# Patient Record
Sex: MALE | Race: WHITE | Employment: FULL TIME | URBAN - METROPOLITAN AREA
[De-identification: names, ages, dates, MRNs, and addresses within clinical notes are randomized per-mention and may not be internally consistent; named-entity substitution may affect disease eponyms.]

---
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
## 2024-01-27 ENCOUNTER — APPOINTMENT (OUTPATIENT)
Dept: GENERAL RADIOLOGY | Facility: HOSPITAL | Age: 24
End: 2024-01-27
Attending: STUDENT IN AN ORGANIZED HEALTH CARE EDUCATION/TRAINING PROGRAM
Payer: COMMERCIAL

## 2024-01-27 ENCOUNTER — HOSPITAL ENCOUNTER (EMERGENCY)
Facility: HOSPITAL | Age: 24
Discharge: HOME | End: 2024-01-27
Attending: STUDENT IN AN ORGANIZED HEALTH CARE EDUCATION/TRAINING PROGRAM
Payer: COMMERCIAL

## 2024-01-27 VITALS
HEIGHT: 72 IN | TEMPERATURE: 98 F | SYSTOLIC BLOOD PRESSURE: 137 MMHG | HEART RATE: 78 BPM | OXYGEN SATURATION: 99 % | DIASTOLIC BLOOD PRESSURE: 67 MMHG | WEIGHT: 220 LBS | BODY MASS INDEX: 29.8 KG/M2 | RESPIRATION RATE: 16 BRPM

## 2024-01-27 DIAGNOSIS — L97.419 HEEL ULCERATION, RIGHT, WITH UNSPECIFIED SEVERITY (HCC): Primary | ICD-10-CM

## 2024-01-27 PROCEDURE — 99284 EMERGENCY DEPT VISIT MOD MDM: CPT

## 2024-01-27 PROCEDURE — 87147 CULTURE TYPE IMMUNOLOGIC: CPT | Performed by: STUDENT IN AN ORGANIZED HEALTH CARE EDUCATION/TRAINING PROGRAM

## 2024-01-27 PROCEDURE — 73650 X-RAY EXAM OF HEEL: CPT | Performed by: STUDENT IN AN ORGANIZED HEALTH CARE EDUCATION/TRAINING PROGRAM

## 2024-01-27 PROCEDURE — 87070 CULTURE OTHR SPECIMN AEROBIC: CPT | Performed by: STUDENT IN AN ORGANIZED HEALTH CARE EDUCATION/TRAINING PROGRAM

## 2024-01-27 PROCEDURE — 87186 SC STD MICRODIL/AGAR DIL: CPT | Performed by: STUDENT IN AN ORGANIZED HEALTH CARE EDUCATION/TRAINING PROGRAM

## 2024-01-27 PROCEDURE — 87205 SMEAR GRAM STAIN: CPT | Performed by: STUDENT IN AN ORGANIZED HEALTH CARE EDUCATION/TRAINING PROGRAM

## 2024-01-27 PROCEDURE — 87077 CULTURE AEROBIC IDENTIFY: CPT | Performed by: STUDENT IN AN ORGANIZED HEALTH CARE EDUCATION/TRAINING PROGRAM

## 2024-01-27 RX ORDER — CEPHALEXIN 500 MG/1
500 CAPSULE ORAL 4 TIMES DAILY
Qty: 28 CAPSULE | Refills: 0 | Status: SHIPPED | OUTPATIENT
Start: 2024-01-27 | End: 2024-02-03

## 2024-01-27 RX ORDER — DOXYCYCLINE HYCLATE 100 MG/1
100 CAPSULE ORAL 2 TIMES DAILY
Qty: 14 CAPSULE | Refills: 0 | Status: SHIPPED | OUTPATIENT
Start: 2024-01-27 | End: 2024-02-03

## 2024-01-27 NOTE — ED PROVIDER NOTES
Patient Seen in: [facility] Emergency Department      History     Chief Complaint   Patient presents with    Cellulitis     Stated Complaint: R foot pain    Subjective:   HPI    24-year-old male otherwise healthy presenting for evaluation of pain.  Patient was in [facility] a little over 2 weeks ago, 12 January he suffered a right heel injury while he was walking in shallow water.  He thinks that he stepped forcefully on a rock.  He is unsure if anything bit or stung him.  The next day he had a blister and the day following that he popped the blister and it drained clear to yellow fluid.  Since then has had ongoing wound and pain persistent wound on his heel, as well as pain.  He initially had some swelling around his ankle but this is improved somewhat.  He does note some clear drainage from the wound after he takes off his socks after the day.  No fevers chills or night sweats.  No history of diabetes or immunocompromise.  Objective:   No pertinent past medical history.            No pertinent past surgical history.              No pertinent social history.            Review of Systems    Positive for stated complaint: R foot pain  Other systems are as noted in HPI.  Constitutional and vital signs reviewed.      All other systems reviewed and negative except as noted above.    Physical Exam     ED Triage Vitals [01/27/24 1417]   /83   Pulse 81   Resp 16   Temp 98.1 °F (36.7 °C)   Temp src Oral   SpO2 100 %   O2 Device None (Room air)       Current:/83   Pulse 81   Temp 98.1 °F (36.7 °C) (Oral)   Resp 16   Ht 182.9 cm (6')   Wt 99.8 kg   SpO2 100%   BMI 29.84 kg/m²         Physical Exam    Constitutional: awake, alert, no sig distress  HENT: mmm, no lesions,  Neck: normal range of motion, no tenderness, supple.  Eyes: PERRL, EOMI, conjunctiva normal, no discharge. Sclera anicteric.  Cardiovascular: rr no murmur  Respiratory: Normal breath sounds, no respiratory distress, no wheezing,  no chest tenderness.  GI: Bowel sounds normal, Soft, no tenderness, no masses, no pulsatile masses.  : No CVA tenderness.  Skin: Warm, dry, no erythema, no rash.  -Mildly tender right heel ulcer about 1.5 cm in diameter, clean-based edges, no discharge or drainage does not probe to bone  Musculoskeletal: Intact distal pulses, no edema, no tenderness, no cyanosis, no clubbing. Good range of motion in all major joints. No tenderness to palpation or major deformities noted. Back- No tenderness.  Neurologic: Alert & oriented x 3, normal motor function, normal sensory function, no focal deficits noted.  Psych: Calm, cooperative, nl affect        ED Course     Labs Reviewed   AEROBIC BACTERIAL CULTURE          ED Course as of 01/27/24 1630  ------------------------------------------------------------  Time: 01/27 1622  Comment: Independently reviewed patient's heel x-ray which is remarkable for soft tissue swelling but no radiopaque foreign body, or evidence of osseous destructive changes.              MDM      24-year-old male with history as documented above presenting with nonhealing right heel wound following.   On arrival vital signs are stable reassuring he is afebrile nontoxic and nonseptic in appearance  DDx: Friction blister, cellulitis, lower suspicion for osteomyelitis but a consideration  Plan: Aerobic cx, Heel XR      Started on cephalexin and doxycycline after discussion with [facility] Pharm.D.  Return precautions and follow-up instructions were discussed with patient who voiced understanding and agreement the plan.  All questions were answered to patient satisfaction.                           Medical Decision Making      Disposition and Plan     Clinical Impression:  1. Heel ulceration, right, with unspecified severity (HCC)         Disposition:  Discharge  1/27/2024  4:29 pm    Follow-up:  Elba Ramos,   [address] [id number] [phone]    Call  today      [facility] Emergency Department  [address]  [facility] [address]  [phone]  Follow up  As needed, If symptoms worsen          Medications Prescribed:  Current Discharge Medication List        START taking these medications    Details   doxycycline 100 MG Oral Cap Take 1 capsule (100 mg total) by mouth 2 (two) times daily for 7 days.  Qty: 14 capsule, Refills: 0      cephalexin 500 MG Oral Cap Take 1 capsule (500 mg total) by mouth 4 (four) times daily for 7 days.  Qty: 28 capsule, Refills: 0

## 2024-01-27 NOTE — ED INITIAL ASSESSMENT (HPI)
Patient from home with c/o right heel pain after he stepped on a rock in [place] 16 days ago. Reports a blister the next day that he drained and now continues to drain.

## 2024-01-27 NOTE — DISCHARGE INSTRUCTIONS
Return to the emergency department if you develop significant pain or swelling at the site of  your infection, if you develop high fevers, nausea, vomiting, or diarrhea, or if the redness of your  skin is extending beyond the area where it is red today as these could be signs of worsening  infection requiring further medical care.

## 2024-01-30 NOTE — PROGRESS NOTES
ED Culture Callback Results Review    Pharmacist reviewed culture results from ED visit .    Final wound culture positive for staph aureus and corynebacterium. Patient was prescribed Cephalexin (Keflex) and Doxycycline (Vibramycin) on discharge. Current therapy is appropriate based on reported susceptibilities. No further intervention required at this time.      Krupa Dhaliwal, [place]  Emergency Medicine Pharmacist Specialist  01/30/24; 11:53 AM

## 2025-01-02 ENCOUNTER — HOSPITAL ENCOUNTER (OUTPATIENT)
Age: 25
Discharge: HOME | End: 2025-01-02

## 2025-01-02 VITALS
DIASTOLIC BLOOD PRESSURE: 71 MMHG | TEMPERATURE: 98 F | HEART RATE: 81 BPM | OXYGEN SATURATION: 100 % | SYSTOLIC BLOOD PRESSURE: 128 MMHG | RESPIRATION RATE: 18 BRPM

## 2025-01-02 DIAGNOSIS — H65.92 LEFT OTITIS MEDIA WITH EFFUSION: Primary | ICD-10-CM

## 2025-01-02 RX ORDER — OFLOXACIN 3 MG/ML
5 SOLUTION AURICULAR (OTIC) DAILY
Qty: 10 ML | Refills: 0 | Status: SHIPPED | OUTPATIENT
Start: 2025-01-02 | End: 2025-01-09

## 2025-01-02 NOTE — ED INITIAL ASSESSMENT (HPI)
Pt presents to the IC with c/o decreased hearing from the left ear after completing antibiotics for otitis media. No pain.

## 2025-01-02 NOTE — DISCHARGE INSTRUCTIONS
Return to Immediate care or ER if any of the following occur    -Unusual drowsiness or confusion  -Neck pain, stiff neck or headache  -Swelling, redness or tenderness of the outer ear  -Headache, neck pain or stiff neck  -Worsening symptoms  -Or any other concerns.    DO NOT use cotton applicators (Q-tips), matches, toothpicks, alexei pins, keys or other objects  to clean the ear canal. This can cause infection of the ear canal or rupture of the eardrum.    FYI-OME is most commonly caused by either viral or allergy-related factors, not bacterial infection. Therefore, the use of antibiotics is not recommended. Also, corticoids for the treatment of allergies have not been significantly proven to impact the outcomes of OME in patients. For these reasons, antibiotics and corticoids are not recommended to treat OME. The best practice for OME patients is watchful waiting for three months as a first-line measure. In cases where OME persists, a specialist referral may be made to assess for surgical treatment options

## 2025-01-02 NOTE — ED PROVIDER NOTES
Patient Seen in: [facility]      History     Chief Complaint   Patient presents with    Ear Problem Pain     Stated Complaint: Ear issue    Subjective:   HPI    Chief Complaint   Patient presents with    Ear Problem Pain       HPI:     Altaf Khan is a 24 year old male who presents with a chief complaint of left ear pain.  Onset of symptoms was    7  days ago.  Symptoms reported as hearing loss and are reported as gradually worsened since that time. Patient denies fever.  Care prior to arrival consisted of nothing, with no relief. States he \"knew his ear was infected but never got it treated, I'm here today for treatment.\"Denies f/c/n/v/d. No recent sick exposures. Denies recent infections/covid exposures.       [facility]  PFS asessment screens reviewed and agree.  Nursing note reviewed and I agree with documentation.    Family History   Problem Relation Age of Onset    Allergies Other     Asthma Other     Diabetes Other     Heart Disease Other     Hypertension Other     Mental Disorder Other     Obesity Other      Family history reviewed with patient/caregiver and is not pertinent to presenting problem.  Social History     Socioeconomic History    Marital status: Single     Spouse name: Not on file    Number of children: Not on file    Years of education: Not on file    Highest education level: Not on file   Occupational History    Not on file   Tobacco Use    Smoking status: Never    Smokeless tobacco: Never   Substance and Sexual Activity    Alcohol use: No    Drug use: No    Sexual activity: Not on file   Other Topics Concern    Not on file   Social History Narrative    Not on file     Social Drivers of Health     Financial Resource Strain: Not on file   Food Insecurity: Not on file   Transportation Needs: Not on file   Physical Activity: Not on file   Stress: Not on file   Social Connections: Not on file   Housing Stability: Not on file         ROS:   Positive for stated complaint: L ear  pain  All other systems reviewed and negative except as noted above.  Constitutional and Vital Signs Reviewed.      Physical Exam:     Findings:    /71   Pulse 81   Temp 98.2 °F (36.8 °C) (Oral)   Resp 18   SpO2 100%   GENERAL: well developed, well nourished, well hydrated, no distress  SKIN: good skin turgor, no obvious rashes  NECK: supple, no adenopathy  CARDIO: RRR without murmur  EXTREMITIES: no cyanosis or edema. HINDS without difficulty  GI: soft, non-tender, normal bowel sounds  HEAD: normocephalic  EYES: sclera non icteric bilateral, conjunctiva clear  EARS: TM  left: fluid present  NOSE: nasal turbinates: pink, normal mucosa and clear drainage  THROAT: clear, without exudates  LUNGS: clear to auscultation bilaterally; no rales, rhonchi, or wheezes    MDM/Assessment/Plan:   Orders for this encounter:    Orders Placed This Encounter    ofloxacin 0.3 % Otic Solution     Sig: Place 5 drops in ear(s) daily for 7 days.     Dispense:  10 mL     Refill:  0       Labs performed this visit:  No results found for this or any previous visit (from the past 10 hours).    MDM:  24 year old male p/w left ear pain x7 days. Reports pain/discharge. Cw OME. Will treat.       Physical exam remained stable over serial reexaminations as previously documented. External chart review completed. No recent hospitalizations for the same.      I have discussed with the patient the results of tests, differential diagnosis, and warning signs and symptoms that should prompt immediate return.  The patient understands these instructions and agrees to the follow-up plan provided.  There are no barriers to learning.   Appropriate f/u given.  Patient agrees to return for any concerns/problems/complications.    Differential diagnosis reflecting the complexity of care include: See above    Comorbidities that add complexity to management include:none    External chart review was done and was noted:Yes    History obtained by an independent  source was from: Patient    Discussions of management was done with:Patient    My independent interpretation of studies of:none    Diagnostic tests and medications considered but not ordered were:na    Social determinants of health that affect care:NA    Shared decision making was done by Self, Patient    Diagnosis:    ICD-10-CM    1. Left otitis media with effusion  H65.92           All results reviewed and discussed with patient.  See AVS for detailed discharge instructions for your condition today.    Follow Up with:  Kailash Aldana MD  [address] [place] [id number] [phone]